# Patient Record
Sex: MALE | Race: WHITE | Employment: OTHER | URBAN - METROPOLITAN AREA
[De-identification: names, ages, dates, MRNs, and addresses within clinical notes are randomized per-mention and may not be internally consistent; named-entity substitution may affect disease eponyms.]

---

## 2024-03-28 NOTE — PROGRESS NOTES
Instructions [facility]      Today's Date: 3/28/2024      Patient's Name: Maldonado Sharma      Procedure Date: 4/12        Please enter the main entrance of the hospital and check-in at the  located in the lobby.      Do NOT eat or drink anything, including candy, gum, or ice chips after midnight prior to your procedure, unless it is part of your prep.  Brush your teeth before coming to the hospital.You may swish with water, but do not swallow.  No smoking/Vaping/E-Cigarettes 24 hours prior to the day of procedure.  No alcohol 24 hours prior to the day of procedure.  No recreational drugs for one week prior to the day of procedure.  Bring Photo ID, Insurance information, and Co-pay if required on day of procedure.  Bring in pertinent legal documents, such as, Medical Power of , DNR, Advance Directive, etc.  Leave all other valuables, including money/purse, at home.  Remove jewelry, including ALL body piercings, nail polish, acrylic nails, and makeup (including mascara); no lotions, powders, deodorant, and/or perfume/cologne/after shave on the skin.  Glasses and dentures may be worn to the hospital.  They must be removed prior to procedure. Please bring case/container for glasses or dentures.  11. Contacts should not be worn on day of procedure.   12. Call the office ([phone]) if you have symptoms of a cold or illness within 24-48 hours prior to your procedure.   13. AN ADULT (relative or friend 18 years or older) MUST DRIVE YOU HOME AFTER YOUR PROCEDURE.   14. Please make arrangements for a responsible adult (18 years or older) to be with you for 24 hours after your procedure.   15. ONE VISITOR will be allowed in the waiting area during your procedure.       Special Instructions:      Bring list of CURRENT medications.  Follow instructions from the office regarding Bowel Prep, Vitamins, Iron, Blood Thinners, Insulin, Seizure, and Blood

## 2024-04-10 ENCOUNTER — ANESTHESIA EVENT (OUTPATIENT)
Facility: HOSPITAL | Age: 74
End: 2024-04-10
Payer: MEDICARE

## 2024-04-12 ENCOUNTER — HOSPITAL ENCOUNTER (OUTPATIENT)
Facility: HOSPITAL | Age: 74
Setting detail: OUTPATIENT SURGERY
Discharge: HOME | End: 2024-04-12
Attending: INTERNAL MEDICINE | Admitting: INTERNAL MEDICINE
Payer: MEDICARE

## 2024-04-12 ENCOUNTER — ANESTHESIA (OUTPATIENT)
Facility: HOSPITAL | Age: 74
End: 2024-04-12
Payer: MEDICARE

## 2024-04-12 VITALS
SYSTOLIC BLOOD PRESSURE: 146 MMHG | OXYGEN SATURATION: 96 % | BODY MASS INDEX: 30.51 KG/M2 | HEIGHT: 69 IN | HEART RATE: 72 BPM | TEMPERATURE: 98 F | WEIGHT: 206 LBS | RESPIRATION RATE: 18 BRPM | DIASTOLIC BLOOD PRESSURE: 75 MMHG

## 2024-04-12 PROCEDURE — 2580000003 HC RX 258: Performed by: NURSE ANESTHETIST, CERTIFIED REGISTERED

## 2024-04-12 PROCEDURE — 6360000002 HC RX W HCPCS: Performed by: ANESTHESIOLOGY

## 2024-04-12 PROCEDURE — 2709999900 HC NON-CHARGEABLE SUPPLY: Performed by: INTERNAL MEDICINE

## 2024-04-12 PROCEDURE — 3700000001 HC ADD 15 MINUTES (ANESTHESIA): Performed by: INTERNAL MEDICINE

## 2024-04-12 PROCEDURE — 7100000000 HC PACU RECOVERY - FIRST 15 MIN: Performed by: INTERNAL MEDICINE

## 2024-04-12 PROCEDURE — 3700000000 HC ANESTHESIA ATTENDED CARE: Performed by: INTERNAL MEDICINE

## 2024-04-12 PROCEDURE — 88305 TISSUE EXAM BY PATHOLOGIST: CPT

## 2024-04-12 PROCEDURE — 3600007502: Performed by: INTERNAL MEDICINE

## 2024-04-12 PROCEDURE — 3600007512: Performed by: INTERNAL MEDICINE

## 2024-04-12 PROCEDURE — 2500000003 HC RX 250 WO HCPCS: Performed by: ANESTHESIOLOGY

## 2024-04-12 PROCEDURE — 7100000010 HC PHASE II RECOVERY - FIRST 15 MIN: Performed by: INTERNAL MEDICINE

## 2024-04-12 RX ORDER — SODIUM CHLORIDE, SODIUM LACTATE, POTASSIUM CHLORIDE, CALCIUM CHLORIDE 600; 310; 30; 20 MG/100ML; MG/100ML; MG/100ML; MG/100ML
INJECTION, SOLUTION INTRAVENOUS CONTINUOUS
Status: DISCONTINUED | OUTPATIENT
Start: 2024-04-12 | End: 2024-04-12 | Stop reason: HOSPADM

## 2024-04-12 RX ORDER — LIDOCAINE HYDROCHLORIDE 10 MG/ML
1 INJECTION, SOLUTION EPIDURAL; INFILTRATION; INTRACAUDAL; PERINEURAL
Status: DISCONTINUED | OUTPATIENT
Start: 2024-04-12 | End: 2024-04-12 | Stop reason: HOSPADM

## 2024-04-12 RX ORDER — PROPOFOL 10 MG/ML
INJECTION, EMULSION INTRAVENOUS PRN
Status: DISCONTINUED | OUTPATIENT
Start: 2024-04-12 | End: 2024-04-12 | Stop reason: SDUPTHER

## 2024-04-12 RX ORDER — FAMOTIDINE 20 MG/1
20 TABLET, FILM COATED ORAL ONCE
Status: DISCONTINUED | OUTPATIENT
Start: 2024-04-12 | End: 2024-04-12 | Stop reason: HOSPADM

## 2024-04-12 RX ORDER — LIDOCAINE HYDROCHLORIDE 20 MG/ML
INJECTION, SOLUTION EPIDURAL; INFILTRATION; INTRACAUDAL; PERINEURAL PRN
Status: DISCONTINUED | OUTPATIENT
Start: 2024-04-12 | End: 2024-04-12 | Stop reason: SDUPTHER

## 2024-04-12 RX ADMIN — PROPOFOL 50 MG: 10 INJECTION, EMULSION INTRAVENOUS at 13:09

## 2024-04-12 RX ADMIN — PROPOFOL 50 MG: 10 INJECTION, EMULSION INTRAVENOUS at 13:11

## 2024-04-12 RX ADMIN — PROPOFOL 100 MG: 10 INJECTION, EMULSION INTRAVENOUS at 13:02

## 2024-04-12 RX ADMIN — LIDOCAINE HYDROCHLORIDE 60 MG: 20 INJECTION, SOLUTION EPIDURAL; INFILTRATION; INTRACAUDAL; PERINEURAL at 13:02

## 2024-04-12 RX ADMIN — SODIUM CHLORIDE, POTASSIUM CHLORIDE, SODIUM LACTATE AND CALCIUM CHLORIDE: 600; 310; 30; 20 INJECTION, SOLUTION INTRAVENOUS at 09:39

## 2024-04-12 ASSESSMENT — PAIN SCALES - GENERAL
PAINLEVEL_OUTOF10: 0

## 2024-04-12 ASSESSMENT — PAIN - FUNCTIONAL ASSESSMENT: PAIN_FUNCTIONAL_ASSESSMENT: 0-10

## 2024-04-12 NOTE — ANESTHESIA POSTPROCEDURE EVALUATION
Department of Anesthesiology  Postprocedure Note    Patient: Maldonado Sharma  MRN: [id number]  YOB: 1950  Date of evaluation: 4/12/2024    Procedure Summary       Date: 04/12/24 Room / Location: [facility] ENDO 02 / [facility] ENDOSCOPY    Anesthesia Start: 1255 Anesthesia Stop: 1322    Procedure: COLONOSCOPY with polypectomies Diagnosis:       Personal history of colonic polyps      (Personal history of colonic polyps [Z86.010])    Surgeons: Arslan Del Rio MD Responsible Provider: Qing Levy MD    Anesthesia Type: MAC ASA Status: 3            Anesthesia Type: No value filed.    Earlene Phase I: Earlene Score: 9    Earlene Phase II: Earlene Score: 10    Anesthesia Post Evaluation    Patient location during evaluation: bedside  Patient participation: complete - patient participated  Airway patency: patent  Cardiovascular status: hemodynamically stable  Respiratory status: acceptable  Hydration status: stable    No notable events documented.   no

## 2024-04-12 NOTE — H&P
Chief Complaint: History of colon polyps         History of Present Illness:   This patient is a 73-year-old gentleman presents for follow-up of adenomatous colon polyps. His last colonoscopic exam 5 years ago was significant for pedunculated adenomatous polyp. He is currently asymptomatic. Since his last exam he developed A-fib for which reason he is on Eliquis.      Past Medical History      Medical Conditions: Acid Reflux  Atrial Fibrillation  C.O.P.D.  Carotid artery disease  Chronic Obstructive Pulmonary Disease (C.O.P.D.)  Colon polyps  Coronary Artery Disease  Coronary Stent  Elevated cholesterol  High blood pressure  Sleep apnea  Sleep apnea with cpap machine  Umbilical Hernia   Surgical Procedures: 2015-cardiac stent, 8/2015  cystectomy  Coronary artery stent  Ear drum repair  Cardiac catherization  Vasectomy  inguinal hernia repair   Dx Studies: Colonoscopy, 3/12/2019  Colonoscopy, 3/12/2019, Mild diverticulosis of the colon and Polyp (6 mm) in the sigmoid colon. (Polypectomy) path: TA  EGD, 4/4/2018  Patient is on oxygen at home., CPAP Machine  Radiology: CT Abdomen Pelvis, 3/18/2018  Radiology: US Abdomen, 3/19/2018   Medications: Alavert 10 mg Take 1 tablet by mouth once a day  atorvastatin 80 mg 1 tablet daily  Calcium 500 500 mg calcium (1,250 mg) Take 1 tablet by mouth once a day  Co Q-10 100 mg Take 1 capsule by mouth once a day as directed  cranberry 500 mg Take 1 capsule by mouth once a day as directed  echinacea 500 mg Take 1 capsule by mouth once a day  echinacea 500 mg Take 1 capsule by mouth once a day  Eliquis 5 mg Take 1 tablet by mouth twice a day  Fish Oil 120-180 mg Take 1 capsule by mouth once a day  IS-ZC 50 50 mg Take 1 tablet by mouth once a day  lisinopril 20 mg Take 1 tablet by mouth once a day  metoprolol tartrate 25 mg Take 1 tablet by mouth twice a day as directed  Multaq 400 mg 1 tablet twice daily  Norvasc 5 mg 1 tablet daily  One-A-Day Men's 50 Plus 400-370 mcg 1 tablet once a

## 2024-04-12 NOTE — ANESTHESIA PRE PROCEDURE
Department of Anesthesiology  Preprocedure Note       Name:  Maldonado Sharma   Age:  73 y.o.  :  1950                                          MRN:  [id number]         Date:  2024      Surgeon: Surgeon(s):  Arslan Del Rio MD    Procedure: Procedure(s):  COLONOSCOPY    Medications prior to admission:   Prior to Admission medications    Medication Sig Start Date End Date Taking? Authorizing Provider   apixaban (ELIQUIS) 5 MG TABS tablet Take 1 tablet by mouth 2 times daily 3/21/24  Yes Provider, Historical, MD   dronedarone hcl (MULTAQ) 400 MG TABS Take 1 tablet by mouth 2 times daily (with meals) 24  Yes Provider, Historical, MD   ascorbic acid (VITAMIN C) 500 MG tablet Take 1 tablet by mouth daily    Automatic Reconciliation, Ar   atorvastatin (LIPITOR) 40 MG tablet 2 tablets daily ceived the following from [facility]: Outside name: atorvastatin (LIPITOR) 40 mg tablet 3/20/18   Automatic Reconciliation, Ar   Calcium Carbonate-Vitamin D 600-5 MG-MCG TABS Take 1 tablet by mouth 2 times daily    Automatic Reconciliation, Ar   calcium citrate (CALCITRATE) 950 (200 Ca) MG tablet Take by mouth daily    Automatic Reconciliation, Ar   Calcium Polycarbophil (FIBER) 625 MG TABS Take 1 tablet by mouth daily    Automatic Reconciliation, Ar   Cholecalciferol 75 MCG (3000 UT) TABS Take by mouth    Automatic Reconciliation, Ar   coenzyme Q10 100 MG CAPS capsule Take 1 capsule by mouth daily    Automatic Reconciliation, Ar   cyanocobalamin 1000 MCG tablet Take 1 tablet by mouth daily    Automatic Reconciliation, Ar   fluocinonide (LIDEX) 0.05 % cream Apply topically 2 times daily  Patient not taking: Reported on 2024    Automatic Reconciliation, Ar   folic acid (FOLVITE) 800 MCG tablet Take 1 tablet by mouth daily    Automatic Reconciliation, Ar   glucosamine-chondroitin 750-600 MG TABS tablet Take by mouth    Automatic Reconciliation, Ar   lisinopril (PRINIVIL;ZESTRIL) 20 MG tablet Take

## 2024-04-12 NOTE — DISCHARGE INSTRUCTIONS
Metamucil or Citracel  Colonoscopy: What to Expect at Home  Your Recovery  After a colonoscopy, you'll stay at the clinic until you wake up. Then you can go home. But you'll need to arrange for a ride. Your doctor will tell you when you can eat and do your other usual activities.  Your doctor will talk to you about when you'll need your next colonoscopy. Your doctor can help you decide how often you need to be checked. This will depend on the results of your test and your risk for colorectal cancer.  After the test, you may be bloated or have gas pains. You may need to pass gas. If a biopsy was done or a polyp was removed, you may have streaks of blood in your stool (feces) for a few days. Problems such as heavy rectal bleeding may not occur until several weeks after the test. This isn't common. But it can happen after polyps are removed.  This care sheet gives you a general idea about how long it will take for you to recover. But each person recovers at a different pace. Follow the steps below to get better as quickly as possible.  How can you care for yourself at home?  Activity    Rest when you feel tired.     You can do your normal activities when it feels okay to do so.   Diet    Follow your doctor's directions for eating.     Unless your doctor has told you not to, drink plenty of fluids. This helps to replace the fluids that were lost during the colon prep.     Do not drink alcohol.   Medicines    Your doctor will tell you if and when you can restart your medicines. You will also be given instructions about taking any new medicines.     If you take aspirin or some other blood thinner, ask your doctor if and when to start taking it again. Make sure that you understand exactly what your doctor wants you to do.     If polyps were removed or a biopsy was done during the test, your doctor may tell you not to take aspirin or other anti-inflammatory medicines for a few days. These include ibuprofen (Advil, Motrin)  completed the sign-up process. If you do not sign up before the expiration date, you must request a new code.    [employer] Access Code: Activation code not generated  Current [employer] Status: Active (This is the date your [employer] access code will )    Enter the last four digits of your Social Security Number (xxxx) and Date of Birth (mm/dd/yyyy) as indicated and click Submit. You will be taken to the next sign-up page.  Create a [employer] ID. This will be your [employer] login ID and cannot be changed, so think of one that is secure and easy to remember.  Create a [employer] password. You can change your password at any time.  Enter your Password Reset Question and Answer. This can be used at a later time if you forget your password.   Enter your e-mail address. You will receive e-mail notification when new information is available in [employer].  Click Sign Up. You can now view and download portions of your medical record.  Click the Download Summary menu link to download a portable copy of your medical information.    Additional Information    If you have questions, please call [phone]. Remember, [employer] is NOT to be used for urgent needs. For medical emergencies, dial 911.    Educational references and/or instructions provided during this visit included:    See Attached      APPOINTMENTS:    Per MD Instruction    Discharge information has been reviewed with the patient.  The patient verbalized understanding.     Patient armband removed and shredded

## 2025-03-10 LAB
A/G RATIO: 1.6 RATIO (ref 1.1–2.6)
ALBUMIN: 4.1 G/DL (ref 3.5–5)
ALP BLD-CCNC: 68 U/L (ref 40–125)
ALT SERPL-CCNC: 19 U/L (ref 5–40)
ANION GAP SERPL CALCULATED.3IONS-SCNC: 11 MMOL/L (ref 3–15)
AST SERPL-CCNC: 24 U/L (ref 10–37)
BASOPHILS # BLD: 1 % (ref 0–2)
BASOPHILS ABSOLUTE: 0 K/UL (ref 0–0.2)
BILIRUB SERPL-MCNC: 0.7 MG/DL (ref 0.2–1.2)
BUN BLDV-MCNC: 15 MG/DL (ref 6–22)
CALCIUM SERPL-MCNC: 9.1 MG/DL (ref 8.4–10.5)
CHLORIDE BLD-SCNC: 107 MMOL/L (ref 98–110)
CHOLESTEROL, TOTAL: 138 MG/DL (ref 110–200)
CHOLESTEROL/HDL RATIO: 3.2 (ref 0–5)
CO2: 26 MMOL/L (ref 20–32)
CREAT SERPL-MCNC: 0.7 MG/DL (ref 0.8–1.6)
EOSINOPHIL # BLD: 4 % (ref 0–6)
EOSINOPHILS ABSOLUTE: 0.2 K/UL (ref 0–0.5)
ESTIMATED AVERAGE GLUCOSE: 114 MG/DL (ref 91–123)
GFR, ESTIMATED: >60 ML/MIN/1.73 SQ.M.
GLOBULIN: 2.5 G/DL (ref 2–4)
GLUCOSE: 97 MG/DL (ref 70–99)
HBA1C MFR BLD: 5.6 % (ref 4.8–5.6)
HCT VFR BLD CALC: 42.1 % (ref 37.8–52.2)
HDLC SERPL-MCNC: 43 MG/DL
HEMOGLOBIN: 13.7 G/DL (ref 12.6–17.1)
LDL CHOLESTEROL: 77 MG/DL (ref 50–99)
LDL/HDL RATIO: 1.8
LYMPHOCYTES # BLD: 39 % (ref 20–45)
LYMPHOCYTES ABSOLUTE: 1.8 K/UL (ref 1–4.8)
MCH RBC QN AUTO: 31 PG (ref 26–34)
MCHC RBC AUTO-ENTMCNC: 33 G/DL (ref 31–36)
MCV RBC AUTO: 94 FL (ref 80–95)
MONOCYTES ABSOLUTE: 0.6 K/UL (ref 0.1–1)
MONOCYTES: 12 % (ref 3–12)
NEUTROPHILS ABSOLUTE: 2 K/UL (ref 1.8–7.7)
NEUTROPHILS SEGMENTED: 44 % (ref 40–75)
NON-HDL CHOLESTEROL: 95 MG/DL
PDW BLD-RTO: 14.6 % (ref 10–15.5)
PLATELET # BLD: 183 K/UL (ref 140–440)
PMV BLD AUTO: 9.9 FL (ref 9–13)
POTASSIUM SERPL-SCNC: 4.4 MMOL/L (ref 3.5–5.5)
RBC # BLD: 4.46 M/UL (ref 3.8–5.8)
SCREENING PSA: 0.39 NG/ML
SODIUM BLD-SCNC: 144 MMOL/L (ref 133–145)
TOTAL PROTEIN: 6.6 G/DL (ref 6.2–8.1)
TRIGL SERPL-MCNC: 88 MG/DL (ref 40–149)
VLDLC SERPL CALC-MCNC: 18 MG/DL (ref 8–30)
WBC # BLD: 4.5 K/UL (ref 4–11)

## (undated) DEVICE — BASIN EMSIS 16OZ GRAPHITE PLAS KID SHP MOLD GRAD FOR ORAL

## (undated) DEVICE — CATHETER SCLERO L240CM NDL 25GA L4MM SHTH DIA2.3MM CNTRST

## (undated) DEVICE — GAUZE,SPONGE,4"X4",16PLY,STRL,LF,10/TRAY: Brand: MEDLINE

## (undated) DEVICE — CANNULA ORIG TL CLR W FOAM CUSHIONS AND 14FT SUPL TB 3 CHN

## (undated) DEVICE — SYRINGE MED 10ML LUERLOCK TIP W/O SFTY DISP

## (undated) DEVICE — MEDI-VAC NON-CONDUCTIVE SUCTION TUBING: Brand: CARDINAL HEALTH

## (undated) DEVICE — SYRINGE MED 50ML LUERSLIP TIP

## (undated) DEVICE — SYRINGE MED 50ML LUERLOCK TIP

## (undated) DEVICE — UNDERPAD INCONT W23XL36IN STD BLU POLYPR BK FLUF SFT

## (undated) DEVICE — SYRINGE MED 25GA 3ML L5/8IN SUBQ PLAS W/ DETACH NDL SFTY